# Patient Record
Sex: MALE | Race: BLACK OR AFRICAN AMERICAN | NOT HISPANIC OR LATINO | URBAN - METROPOLITAN AREA
[De-identification: names, ages, dates, MRNs, and addresses within clinical notes are randomized per-mention and may not be internally consistent; named-entity substitution may affect disease eponyms.]

---

## 2017-04-25 ENCOUNTER — INPATIENT (INPATIENT)
Facility: HOSPITAL | Age: 32
LOS: 0 days | Discharge: HOME | DRG: 300 | End: 2017-04-25
Attending: INTERNAL MEDICINE | Admitting: INTERNAL MEDICINE
Payer: COMMERCIAL

## 2017-04-25 VITALS
RESPIRATION RATE: 16 BRPM | HEART RATE: 76 BPM | DIASTOLIC BLOOD PRESSURE: 61 MMHG | TEMPERATURE: 97 F | OXYGEN SATURATION: 97 % | SYSTOLIC BLOOD PRESSURE: 99 MMHG

## 2017-04-25 VITALS
WEIGHT: 190.04 LBS | TEMPERATURE: 98 F | HEIGHT: 71 IN | HEART RATE: 97 BPM | OXYGEN SATURATION: 97 % | DIASTOLIC BLOOD PRESSURE: 76 MMHG | RESPIRATION RATE: 18 BRPM | SYSTOLIC BLOOD PRESSURE: 128 MMHG

## 2017-04-25 DIAGNOSIS — R63.8 OTHER SYMPTOMS AND SIGNS CONCERNING FOOD AND FLUID INTAKE: ICD-10-CM

## 2017-04-25 DIAGNOSIS — E10.628 TYPE 1 DIABETES MELLITUS WITH OTHER SKIN COMPLICATIONS: ICD-10-CM

## 2017-04-25 DIAGNOSIS — L03.90 CELLULITIS, UNSPECIFIED: ICD-10-CM

## 2017-04-25 DIAGNOSIS — Z29.9 ENCOUNTER FOR PROPHYLACTIC MEASURES, UNSPECIFIED: ICD-10-CM

## 2017-04-25 DIAGNOSIS — I82.601 ACUTE EMBOLISM AND THROMBOSIS OF UNSPECIFIED VEINS OF RIGHT UPPER EXTREMITY: ICD-10-CM

## 2017-04-25 LAB
ALBUMIN SERPL ELPH-MCNC: 3 G/DL — LOW (ref 3.4–5)
ALBUMIN SERPL ELPH-MCNC: 3.2 G/DL — LOW (ref 3.4–5)
ALP SERPL-CCNC: 120 U/L — SIGNIFICANT CHANGE UP (ref 40–120)
ALP SERPL-CCNC: 134 U/L — HIGH (ref 40–120)
ALT FLD-CCNC: 28 U/L — SIGNIFICANT CHANGE UP (ref 12–42)
ALT FLD-CCNC: 30 U/L — SIGNIFICANT CHANGE UP (ref 12–42)
ANION GAP SERPL CALC-SCNC: 8 MMOL/L — LOW (ref 9–16)
ANION GAP SERPL CALC-SCNC: 8 MMOL/L — LOW (ref 9–16)
APTT BLD: 28.8 SEC — SIGNIFICANT CHANGE UP (ref 27.5–36.5)
AST SERPL-CCNC: 13 U/L — LOW (ref 15–37)
AST SERPL-CCNC: 19 U/L — SIGNIFICANT CHANGE UP (ref 15–37)
BASOPHILS NFR BLD AUTO: 0.4 % — SIGNIFICANT CHANGE UP (ref 0–2)
BILIRUB SERPL-MCNC: 0.2 MG/DL — SIGNIFICANT CHANGE UP (ref 0.2–1.2)
BILIRUB SERPL-MCNC: 0.3 MG/DL — SIGNIFICANT CHANGE UP (ref 0.2–1.2)
BUN SERPL-MCNC: 13 MG/DL — SIGNIFICANT CHANGE UP (ref 7–23)
BUN SERPL-MCNC: 17 MG/DL — SIGNIFICANT CHANGE UP (ref 7–23)
CALCIUM SERPL-MCNC: 8.1 MG/DL — LOW (ref 8.5–10.5)
CALCIUM SERPL-MCNC: 8.7 MG/DL — SIGNIFICANT CHANGE UP (ref 8.5–10.5)
CHLORIDE SERPL-SCNC: 100 MMOL/L — SIGNIFICANT CHANGE UP (ref 96–108)
CHLORIDE SERPL-SCNC: 108 MMOL/L — SIGNIFICANT CHANGE UP (ref 96–108)
CO2 SERPL-SCNC: 24 MMOL/L — SIGNIFICANT CHANGE UP (ref 22–31)
CO2 SERPL-SCNC: 26 MMOL/L — SIGNIFICANT CHANGE UP (ref 22–31)
CREAT SERPL-MCNC: 0.77 MG/DL — SIGNIFICANT CHANGE UP (ref 0.5–1.3)
CREAT SERPL-MCNC: 1.17 MG/DL — SIGNIFICANT CHANGE UP (ref 0.5–1.3)
EOSINOPHIL NFR BLD AUTO: 3 % — SIGNIFICANT CHANGE UP (ref 0–6)
FERRITIN SERPL-MCNC: 125.1 NG/ML — SIGNIFICANT CHANGE UP (ref 26–388)
GLUCOSE SERPL-MCNC: 220 MG/DL — HIGH (ref 70–99)
GLUCOSE SERPL-MCNC: 687 MG/DL — CRITICAL HIGH (ref 70–99)
HCT VFR BLD CALC: 38 % — LOW (ref 39–50)
HCT VFR BLD CALC: 38.1 % — LOW (ref 39–50)
HGB BLD-MCNC: 12.1 G/DL — LOW (ref 13–17)
HGB BLD-MCNC: 12.5 G/DL — LOW (ref 13–17)
HIV 1+2 AB+HIV1 P24 AG SERPL QL IA: SIGNIFICANT CHANGE UP
IMM GRANULOCYTES NFR BLD AUTO: 0.8 % — SIGNIFICANT CHANGE UP (ref 0–1.5)
INR BLD: 0.97 — SIGNIFICANT CHANGE UP (ref 0.88–1.16)
IRON SATN MFR SERPL: 13 % — LOW (ref 26–39)
IRON SATN MFR SERPL: 36 UG/DL — LOW (ref 65–175)
LACTATE SERPL-SCNC: 1 MMOL/L — SIGNIFICANT CHANGE UP (ref 0.4–2)
LYMPHOCYTES # BLD AUTO: 25.1 % — SIGNIFICANT CHANGE UP (ref 13–44)
MAGNESIUM SERPL-MCNC: 2.2 MG/DL — SIGNIFICANT CHANGE UP (ref 1.6–2.4)
MCHC RBC-ENTMCNC: 30.9 PG — SIGNIFICANT CHANGE UP (ref 27–34)
MCHC RBC-ENTMCNC: 31 PG — SIGNIFICANT CHANGE UP (ref 27–34)
MCHC RBC-ENTMCNC: 31.8 G/DL — LOW (ref 32–36)
MCHC RBC-ENTMCNC: 32.9 G/DL — SIGNIFICANT CHANGE UP (ref 32–36)
MCV RBC AUTO: 94.3 FL — SIGNIFICANT CHANGE UP (ref 80–100)
MCV RBC AUTO: 97.2 FL — SIGNIFICANT CHANGE UP (ref 80–100)
MONOCYTES NFR BLD AUTO: 10.7 % — SIGNIFICANT CHANGE UP (ref 2–14)
NEUTROPHILS NFR BLD AUTO: 60 % — SIGNIFICANT CHANGE UP (ref 43–77)
PHOSPHATE SERPL-MCNC: 3.8 MG/DL — SIGNIFICANT CHANGE UP (ref 2.5–4.5)
PLATELET # BLD AUTO: 255 K/UL — SIGNIFICANT CHANGE UP (ref 150–400)
PLATELET # BLD AUTO: 276 K/UL — SIGNIFICANT CHANGE UP (ref 150–400)
POTASSIUM SERPL-MCNC: 4.4 MMOL/L — SIGNIFICANT CHANGE UP (ref 3.5–5.3)
POTASSIUM SERPL-MCNC: 5.3 MMOL/L — SIGNIFICANT CHANGE UP (ref 3.5–5.3)
POTASSIUM SERPL-SCNC: 4.4 MMOL/L — SIGNIFICANT CHANGE UP (ref 3.5–5.3)
POTASSIUM SERPL-SCNC: 5.3 MMOL/L — SIGNIFICANT CHANGE UP (ref 3.5–5.3)
PROT SERPL-MCNC: 6.6 G/DL — SIGNIFICANT CHANGE UP (ref 6.4–8.2)
PROT SERPL-MCNC: 6.8 G/DL — SIGNIFICANT CHANGE UP (ref 6.4–8.2)
PROTHROM AB SERPL-ACNC: 10.7 SEC — SIGNIFICANT CHANGE UP (ref 9.8–12.7)
RBC # BLD: 3.92 M/UL — LOW (ref 4.2–5.8)
RBC # BLD: 4.03 M/UL — LOW (ref 4.2–5.8)
RBC # FLD: 12.5 % — SIGNIFICANT CHANGE UP (ref 10.3–16.9)
RBC # FLD: 12.8 % — SIGNIFICANT CHANGE UP (ref 10.3–16.9)
SODIUM SERPL-SCNC: 134 MMOL/L — SIGNIFICANT CHANGE UP (ref 132–145)
SODIUM SERPL-SCNC: 140 MMOL/L — SIGNIFICANT CHANGE UP (ref 135–145)
TIBC SERPL-MCNC: 280 UG/DL — SIGNIFICANT CHANGE UP (ref 250–450)
WBC # BLD: 6.3 K/UL — SIGNIFICANT CHANGE UP (ref 3.8–10.5)
WBC # BLD: 7.3 K/UL — SIGNIFICANT CHANGE UP (ref 3.8–10.5)
WBC # FLD AUTO: 6.3 K/UL — SIGNIFICANT CHANGE UP (ref 3.8–10.5)
WBC # FLD AUTO: 7.3 K/UL — SIGNIFICANT CHANGE UP (ref 3.8–10.5)

## 2017-04-25 PROCEDURE — 73201 CT UPPER EXTREMITY W/DYE: CPT | Mod: 26,RT

## 2017-04-25 PROCEDURE — 73090 X-RAY EXAM OF FOREARM: CPT | Mod: 26,RT

## 2017-04-25 PROCEDURE — 99223 1ST HOSP IP/OBS HIGH 75: CPT

## 2017-04-25 PROCEDURE — 93971 EXTREMITY STUDY: CPT | Mod: 26,RT

## 2017-04-25 PROCEDURE — 93010 ELECTROCARDIOGRAM REPORT: CPT

## 2017-04-25 PROCEDURE — 99285 EMERGENCY DEPT VISIT HI MDM: CPT | Mod: 25

## 2017-04-25 RX ORDER — INSULIN HUMAN 100 [IU]/ML
10 INJECTION, SOLUTION SUBCUTANEOUS ONCE
Qty: 0 | Refills: 0 | Status: COMPLETED | OUTPATIENT
Start: 2017-04-25 | End: 2017-04-25

## 2017-04-25 RX ORDER — SODIUM CHLORIDE 9 MG/ML
2000 INJECTION INTRAMUSCULAR; INTRAVENOUS; SUBCUTANEOUS ONCE
Qty: 0 | Refills: 0 | Status: COMPLETED | OUTPATIENT
Start: 2017-04-25 | End: 2017-04-25

## 2017-04-25 RX ORDER — DEXTROSE 50 % IN WATER 50 %
12.5 SYRINGE (ML) INTRAVENOUS ONCE
Qty: 0 | Refills: 0 | Status: DISCONTINUED | OUTPATIENT
Start: 2017-04-25 | End: 2017-04-25

## 2017-04-25 RX ORDER — INSULIN GLARGINE 100 [IU]/ML
8 INJECTION, SOLUTION SUBCUTANEOUS EVERY MORNING
Qty: 0 | Refills: 0 | Status: DISCONTINUED | OUTPATIENT
Start: 2017-04-25 | End: 2017-04-25

## 2017-04-25 RX ORDER — RIVAROXABAN 15 MG-20MG
1 KIT ORAL
Qty: 1 | Refills: 0 | OUTPATIENT
Start: 2017-04-25

## 2017-04-25 RX ORDER — DEXTROSE 50 % IN WATER 50 %
25 SYRINGE (ML) INTRAVENOUS ONCE
Qty: 0 | Refills: 0 | Status: DISCONTINUED | OUTPATIENT
Start: 2017-04-25 | End: 2017-04-25

## 2017-04-25 RX ORDER — INSULIN GLARGINE 100 [IU]/ML
4 INJECTION, SOLUTION SUBCUTANEOUS
Qty: 0 | Refills: 0 | Status: DISCONTINUED | OUTPATIENT
Start: 2017-04-25 | End: 2017-04-25

## 2017-04-25 RX ORDER — GLUCAGON INJECTION, SOLUTION 0.5 MG/.1ML
1 INJECTION, SOLUTION SUBCUTANEOUS ONCE
Qty: 0 | Refills: 0 | Status: DISCONTINUED | OUTPATIENT
Start: 2017-04-25 | End: 2017-04-25

## 2017-04-25 RX ORDER — HEPARIN SODIUM 5000 [USP'U]/ML
5000 INJECTION INTRAVENOUS; SUBCUTANEOUS EVERY 8 HOURS
Qty: 0 | Refills: 0 | Status: DISCONTINUED | OUTPATIENT
Start: 2017-04-25 | End: 2017-04-25

## 2017-04-25 RX ORDER — MORPHINE SULFATE 50 MG/1
4 CAPSULE, EXTENDED RELEASE ORAL ONCE
Qty: 0 | Refills: 0 | Status: DISCONTINUED | OUTPATIENT
Start: 2017-04-25 | End: 2017-04-25

## 2017-04-25 RX ORDER — INSULIN LISPRO 100/ML
VIAL (ML) SUBCUTANEOUS
Qty: 0 | Refills: 0 | Status: DISCONTINUED | OUTPATIENT
Start: 2017-04-25 | End: 2017-04-25

## 2017-04-25 RX ORDER — INSULIN LISPRO 100/ML
20 VIAL (ML) SUBCUTANEOUS
Qty: 0 | Refills: 0 | Status: DISCONTINUED | OUTPATIENT
Start: 2017-04-25 | End: 2017-04-25

## 2017-04-25 RX ORDER — INSULIN GLARGINE 100 [IU]/ML
10 INJECTION, SOLUTION SUBCUTANEOUS EVERY MORNING
Qty: 0 | Refills: 0 | Status: DISCONTINUED | OUTPATIENT
Start: 2017-04-25 | End: 2017-04-25

## 2017-04-25 RX ORDER — SODIUM CHLORIDE 9 MG/ML
1000 INJECTION, SOLUTION INTRAVENOUS
Qty: 0 | Refills: 0 | Status: DISCONTINUED | OUTPATIENT
Start: 2017-04-25 | End: 2017-04-25

## 2017-04-25 RX ORDER — DEXTROSE 50 % IN WATER 50 %
1 SYRINGE (ML) INTRAVENOUS ONCE
Qty: 0 | Refills: 0 | Status: DISCONTINUED | OUTPATIENT
Start: 2017-04-25 | End: 2017-04-25

## 2017-04-25 RX ORDER — VANCOMYCIN HCL 1 G
1000 VIAL (EA) INTRAVENOUS ONCE
Qty: 0 | Refills: 0 | Status: COMPLETED | OUTPATIENT
Start: 2017-04-25 | End: 2017-04-25

## 2017-04-25 RX ADMIN — INSULIN HUMAN 10 UNIT(S): 100 INJECTION, SOLUTION SUBCUTANEOUS at 02:29

## 2017-04-25 RX ADMIN — INSULIN GLARGINE 10 UNIT(S): 100 INJECTION, SOLUTION SUBCUTANEOUS at 11:52

## 2017-04-25 RX ADMIN — Medication: at 11:55

## 2017-04-25 RX ADMIN — INSULIN HUMAN 10 UNIT(S): 100 INJECTION, SOLUTION SUBCUTANEOUS at 04:15

## 2017-04-25 RX ADMIN — SODIUM CHLORIDE 2000 MILLILITER(S): 9 INJECTION INTRAMUSCULAR; INTRAVENOUS; SUBCUTANEOUS at 03:10

## 2017-04-25 RX ADMIN — MORPHINE SULFATE 4 MILLIGRAM(S): 50 CAPSULE, EXTENDED RELEASE ORAL at 02:22

## 2017-04-25 RX ADMIN — Medication 2: at 08:18

## 2017-04-25 RX ADMIN — Medication 1 TABLET(S): at 10:44

## 2017-04-25 RX ADMIN — Medication 2 TABLET(S): at 08:19

## 2017-04-25 RX ADMIN — MORPHINE SULFATE 4 MILLIGRAM(S): 50 CAPSULE, EXTENDED RELEASE ORAL at 04:21

## 2017-04-25 RX ADMIN — Medication 250 MILLIGRAM(S): at 01:49

## 2017-04-25 NOTE — H&P ADULT - PROBLEM SELECTOR PROBLEM 3
Nutrition, metabolism, and development symptoms Uncontrolled type 1 diabetes mellitus with other skin complication

## 2017-04-25 NOTE — H&P ADULT - ASSESSMENT
30 yo M w/ DMT1 (non-compliant w/ insulin), p/w RUE swelling x 4 days concerning for cellulitis and hyperglycemia. 32 yo M w/ DMT1 (non-compliant w/ insulin), p/w RUE swelling x 4 days found to have thrombosis of R radial and R ulnar veins, hospital course further complicated by hyperglycemia.

## 2017-04-25 NOTE — ED PROVIDER NOTE - SKIN WOUND DESCRIPTION
laceration to R forearm, with sutures in place, no crepitus, proximal streaking or induration noted, ttp./clean/SWOLLEN

## 2017-04-25 NOTE — H&P ADULT - NSHPREVIEWOFSYSTEMS_GEN_ALL_CORE
Specifically denies HA, recent changes in vision, cough, CP, SOB, abdominal pain, diarrhea, constipation and dysuria.    Endorsing polyphagia, polydipsia and polyuria.

## 2017-04-25 NOTE — ED PROVIDER NOTE - CARE PLAN
Principal Discharge DX:	Uncontrolled type 1 diabetes mellitus with other skin complication  Secondary Diagnosis:	Wound infection

## 2017-04-25 NOTE — ED PROVIDER NOTE - UPPER EXTREMITY EXAM, RIGHT
LIMITED ROM/TENDERNESS/SWELLING/4 cm horizontal laceration to mid volar forearm, with sutures in place, taut, with diffuse edema, over forearm, hand, and distal fingertips, median/radial/ulnar nn intact to motor and sensory. +2 radial pulse

## 2017-04-25 NOTE — H&P ADULT - PROBLEM SELECTOR PLAN 3
Carbohydrate consistent diet  No indication for IVF as pt able to tolerate PO Pt has poor medical follow up and admits non-compliance w/ insulin therapy.  Reports that he was prescribed Novolog 30 units TID before meals and 4-8 units of Lantus BID. Initial BMP w/ glucose > 600, FS now 174 after 10 units of regular insulin.   - Will start on lantus 10 units q AM + FS + MISS (will try to simplify insulin regimen in setting of non-adherence)  - F/u A1C  - Diabetes education  - Will refer patient to diabetes clinic near home in [place] Pt has poor medical follow up and admits non-compliance w/ insulin therapy.  Reports that he was prescribed Novolog 30 units TID before meals and 4-8 units of Lantus BID. Initial BMP w/ glucose > 600, FS now 174 after 10 units of regular insulin.   - Will start on lantus 10 units q AM + FS + MISS (will try to simplify insulin regimen in setting of non-adherence)  - F/u A1C  - Diabetes education  - Will refer patient to PMD near home  - Will provide information for [facility]

## 2017-04-25 NOTE — DISCHARGE NOTE ADULT - CARE PLAN
Principal Discharge DX:	Cellulitis  Goal:	Resolution of infection  Instructions for follow-up, activity and diet:	You have an infection in your right arm.  It is very important to control your blood sugar while you have an infection as sugar can serve as food for bacteria and promote rapid progression of the infection.  You were given antibiotics in the hospital.  You are also being prescribed oral antibiotics; please take two tabs twice daily for a total of 7 days. If you experience fevers, chills or significant pain in the arm, please visit your nearest emergency department.  Secondary Diagnosis:	Thrombosis of right upper extremity  Instructions for follow-up, activity and diet:	You were found to have a clot in your right upper extremity likely secondary to trauma.  Please use warm compresses to dissolve the clot,  You should have a repeat ultrasound in 2-4 weeks to make sure the clot is dissolving.  Please follow up with a primary physician, information for one has been provided in this handout. If you start to experience difficulty breathing or shortness of breath, please visit your nearest emergency department.  Secondary Diagnosis:	Uncontrolled type 1 diabetes mellitus with other skin complication  Instructions for follow-up, activity and diet:	You were found to have an elevated blood sugar during your stay.  Please continue to take your insulin as prescribed and follow up with a primary physician.  Please also schedule an eye doctor appointment.

## 2017-04-25 NOTE — ED PROVIDER NOTE - MEDICAL DECISION MAKING DETAILS
IVFs, insulin, labs, crp, and sed rate. forearm xrays, hand consultation. spoke to ortho resident at [facility], advised plastics/hand

## 2017-04-25 NOTE — DISCHARGE NOTE ADULT - MEDICATION SUMMARY - MEDICATIONS TO TAKE
I will START or STAY ON the medications listed below when I get home from the hospital:    NovoLOG 100 units/mL subcutaneous solution  -- 30 unit(s) subcutaneous 3 times a day before meals  -- Indication: For Diabetes type 1, uncontrolled    Lantus  -- 4 unit(s) subcutaneous 2 times a day  -- Indication: For Diabetes type 1, uncontrolled    sulfamethoxazole-trimethoprim 800 mg-160 mg oral tablet  -- 2 tab(s) by mouth every 12 hours  -- Indication: For Cellulitis

## 2017-04-25 NOTE — DISCHARGE NOTE ADULT - CARE PROVIDER_API CALL
[facility],   Address: [address] [place] [address]  [phone]  Phone: (   )    -  Fax: (   )    -    [facility] [place] [facility] [place] ([facility] [place]),   Address: [address] [place] [address]  Phone: [phone]  Phone: (   )    -  Fax: (   )    -

## 2017-04-25 NOTE — DISCHARGE NOTE ADULT - HOSPITAL COURSE
32 yo M w/ DMT1 (non-compliant w/ insulin), p/w RUE swelling x 4 days concerning for cellulitis and hyperglycemia.  Also found to have RUE DVT - asked to use warm compress and follow up with vascular as outpt. Sent home on 7 days of Bactrim for cellulitis.  Hyperglycemia resolved asked to f/u w/ PMD. 32 yo M w/ DMT1 (non-compliant w/ insulin), p/w RUE swelling x 4 days concerning for cellulitis and hyperglycemia.  Also found to have RUE DVT - asked to use warm compress and follow up with vascular as outpt.  Called patient to discuss starting Xarelto, but pt gave invalid phone number.  Rx sent to pharmacy. Also sent home on 7 days of Bactrim for cellulitis.  Hyperglycemia resolved w/ 10 units of regular insulin; pt asked to continue home insulin regimen and f/u w/ PMD.  Also given references for [facility] endocrinology and vascular as pt rejected offer to schedule appointments.

## 2017-04-25 NOTE — H&P ADULT - ATTENDING COMMENTS
Patient was seen and examined by me at bedside. I agree with resident's note, subjective, objective physical exam, assessment and plan with following modifications/additions.     1) Uncontrolled diabetes mellitus type 2 with hyperglycemia.  2) DVT upper extremity -ulnar and radial vein.  3) Cellulitis right upper extremity.    Plan: Will c/w home dose insulin regimen. Patient refusing to have his regimen changed and states he is complaint with the medication. Will DC on Keflex for a total course of 7 days. Will start him on NOACs for 3 months. Patient will require repeat venous duplex US in 2-4 weeks for resolution of DVT. Needs outpatient follow up with PCP.

## 2017-04-25 NOTE — H&P ADULT - NSHPPHYSICALEXAM_GEN_ALL_CORE
General: NAD, comfortable, non-diaphoretic, non-toxic appearing  HEENT: 2cm healing laceration over L forehead covered w/ band aid, PERRL, EOMI, sclerae anicteric, no conjunctival injection, MMM  Resp: CTAB no w/r/r, no respiratory distress, able to speak in full sentences w/ ease  Cardio: RRR no m/g/r  Abd: NTND, + BS x 4, no hepatosplenomegaly  Ext: Significant swelling of RUE w/ 4 cm well-healing laceration of medial forearm w/ sutures, 1+ radial pulse, capillary refill < 2 sec, decreased  strength 2/2 swelling, no erythema or warmth appreciated, no abscesses, purulence, crepitus or induration seen. TTP w/ deep palpation 2/2 taut skin; LUE 1+ radial pulse, no pedal edema  Neuro: A&O x 3, CN ii-xii intact, no gross focal neurologic deficits

## 2017-04-25 NOTE — H&P ADULT - PROBLEM SELECTOR PLAN 1
No signs of systemic infection. No ulcers, abscesses or induration appreciated on exam. 1+ R radial pulse. CT w/ IV contrast of RUE reveals no fracture or abscesses. +Infiltration of the soft tissues of the right forearm, concerning for infection. S/p 1 dose of vancomycin.  Able to tolerate PO.    - Will start on Augmentin 875/125 q 12 h + Bactrim DS 2 tabs q 12 h x 10 days  - Keep R hand elevated  - Podiatry and opthalmology f/u as outpt No signs of systemic infection. No ulcers, abscesses or induration appreciated on exam. 1+ R radial pulse. CT w/ IV contrast of RUE reveals no fracture or abscesses. +Infiltration of the soft tissues of the right forearm, concerning for infection. S/p 1 dose of vancomycin.  Able to tolerate PO.    - Will start on Augmentin 875/125 q 12 h + Bactrim DS 2 tabs q 12 h x 10 days  - Keep R hand elevated  - F/u RUE dopplers to r/o DVT  - F/u official RUE xray to r/o osteo  - Podiatry and opthalmology f/u as outpt Low concern for cellulitis as no signs of systemic infection. Afebrile, no leukocytosis.  No ulcers, abscesses or induration appreciated on exam. 1+ R radial pulse. CT w/ IV contrast of RUE reveals no fracture or abscesses. +Infiltration of the soft tissues of the right forearm. XRay insignificant for osteomyelitis. S/p 1 dose of vancomycin.  Able to tolerate PO.  RUE dopplers significant for thrombosis of R radial and R ulnar veins. Likely provoked 2/2 endothelial injury from mechanical trauma. RUE dopplers significant for acute deep venous thrombosis of the distal right radial and ulnar veins.  - Initiate anticoagulation Likely provoked 2/2 endothelial injury from mechanical trauma. RUE dopplers significant for acute deep venous thrombosis of the distal right radial and ulnar veins.  - Warm compress, repeat venous duplex in 2-4 weeks  - Outpt vascular f/u

## 2017-04-25 NOTE — H&P ADULT - PROBLEM SELECTOR PLAN 5
HSQ  Dispo: Keep on RMF for further management of cellulitis  FULL CODE HSQ  Dispo: Keep on RMF for further management of RUE DVT  FULL CODE

## 2017-04-25 NOTE — H&P ADULT - PROBLEM SELECTOR PLAN 2
Pt has poor medical follow up and proclaims non-compliance w/ insulin therapy.  Reports that he was prescribed Novolog 30 units TID before meals and 4-8 units of Lantus BID. Initial BMP w/ glucose > 600, FS now 174 after 10 units of regular insulin.   - Humalog 20 units premeal TID + 4 units lantus qhs + FS + MISS  - F/u A1C  - Diabetes education  - Will refer patient to diabetes clinic near home in [place] Pt has poor medical follow up and admits non-compliance w/ insulin therapy.  Reports that he was prescribed Novolog 30 units TID before meals and 4-8 units of Lantus BID. Initial BMP w/ glucose > 600, FS now 174 after 10 units of regular insulin.   - Humalog 20 units premeal TID + 4 units lantus qhs + FS + MISS  - F/u A1C  - Diabetes education  - Will refer patient to diabetes clinic near home in [place] Pt has poor medical follow up and admits non-compliance w/ insulin therapy.  Reports that he was prescribed Novolog 30 units TID before meals and 4-8 units of Lantus BID. Initial BMP w/ glucose > 600, FS now 174 after 10 units of regular insulin.   - Will start on lantus 10 units q AM + FS + MISS (will try to simplify insulin regimen in setting of non-adherence)  - F/u A1C  - Diabetes education  - Will refer patient to diabetes clinic near home in [place] No signs of systemic infection. No ulcers, abscesses or induration appreciated on exam. 1+ R radial pulse. CT w/ IV contrast of RUE reveals no fracture or abscesses. +Infiltration of the soft tissues of the right forearm, concerning for infection. S/p 1 dose of vancomycin.  Able to tolerate PO.    - Will start on Augmentin 875/125 q 12 h + Bactrim DS 2 tabs q 12 h x 10 days  - Keep R hand elevated  - F/u RUE dopplers to r/o DVT  - F/u official RUE xray to r/o osteo  - Podiatry and opthalmology f/u as outpt Low suspicion for cellulitis as no signs of systemic infection (fever, leukocytosis). No ulcers, abscesses or induration appreciated on exam. No significant erythema or warmth appreciated. 1+ R radial pulse. CT w/ IV contrast of RUE reveals no fracture or abscesses. +Infiltration of the soft tissues of the right forearm. S/p 1 dose of vancomycin.    - No indication for abx at this time.   - Aggressive blood glucose control. Low suspicion for cellulitis as no signs of systemic infection (fever, leukocytosis). No ulcers, abscesses or induration appreciated on exam. No significant erythema or warmth appreciated. 1+ R radial pulse. CT w/ IV contrast of RUE reveals no fracture or abscesses. +Infiltration of the soft tissues of the right forearm. S/p 1 dose of vancomycin, Augmentin and bactrim.   - No indication for abx at this time.   - Aggressive blood glucose control. Low suspicion for cellulitis as no signs of systemic infection (fever, leukocytosis). No ulcers, abscesses or induration appreciated on exam. No significant erythema or warmth appreciated. 1+ R radial pulse. CT w/ IV contrast of RUE reveals no fracture or abscesses. +Infiltration of the soft tissues of the right forearm. S/p 1 dose of vancomycin, Augmentin and bactrim.   - C/w Bactrim x 7 days
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
## 2017-04-25 NOTE — DISCHARGE NOTE ADULT - PATIENT PORTAL LINK FT
“You can access the FollowHealth Patient Portal, offered by [facility], by registering with the following website: [email or web address]”

## 2017-04-25 NOTE — ED ADULT NURSE NOTE - OBJECTIVE STATEMENT
right hand pain and swelling today; sutures to right forearm 4 days ago at [facility] but now has swelling, pain, s/s compartment syndrome

## 2017-04-25 NOTE — DISCHARGE NOTE ADULT - SECONDARY DIAGNOSIS.
Thrombosis of right upper extremity Uncontrolled type 1 diabetes mellitus with other skin complication

## 2017-04-25 NOTE — ED ADULT TRIAGE NOTE - CHIEF COMPLAINT QUOTE
Pt sent by Urgent care earlier today to R/ O compartment syndrome in the R hand. Pt had stitches placed Friday and has swelling and pain to the R forearm and hand
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
## 2017-04-25 NOTE — H&P ADULT - PROBLEM SELECTOR PLAN 4
HSQ  Dispo: Keep on RMF for further management of cellulitis  FULL CODE Carbohydrate consistent diet  No indication for IVF as pt able to tolerate PO

## 2017-04-25 NOTE — DISCHARGE NOTE ADULT - PLAN OF CARE
Resolution of infection You have an infection in your right arm.  It is very important to control your blood sugar while you have an infection as sugar can serve as food for bacteria and promote rapid progression of the infection.  You were given antibiotics in the hospital.  You are also being prescribed oral antibiotics; please take two tabs twice daily for a total of 7 days. If you experience fevers, chills or significant pain in the arm, please visit your nearest emergency department. You were found to have a clot in your right upper extremity likely secondary to trauma.  Please use warm compresses to dissolve the clot,  You should have a repeat ultrasound in 2-4 weeks to make sure the clot is dissolving.  Please follow up with a primary physician, information for one has been provided in this handout. If you start to experience difficulty breathing or shortness of breath, please visit your nearest emergency department. You were found to have an elevated blood sugar during your stay.  Please continue to take your insulin as prescribed and follow up with a primary physician.  Please also schedule an eye doctor appointment.

## 2017-04-26 LAB
FOLATE SERPL-MCNC: 7.7 NG/ML — SIGNIFICANT CHANGE UP (ref 4.8–24.2)
VIT B12 SERPL-MCNC: 444 PG/ML — SIGNIFICANT CHANGE UP (ref 243–894)

## 2017-04-26 RX ORDER — INSULIN GLARGINE 100 [IU]/ML
4 INJECTION, SOLUTION SUBCUTANEOUS
Qty: 0 | Refills: 0 | COMMUNITY

## 2017-04-26 RX ORDER — INSULIN ASPART 100 [IU]/ML
30 INJECTION, SOLUTION SUBCUTANEOUS
Qty: 0 | Refills: 0 | COMMUNITY

## 2017-04-27 DIAGNOSIS — S49.81XA OTHER SPECIFIED INJURIES OF RIGHT SHOULDER AND UPPER ARM, INITIAL ENCOUNTER: ICD-10-CM

## 2017-04-27 DIAGNOSIS — Z91.012 ALLERGY TO EGGS: ICD-10-CM

## 2017-04-27 DIAGNOSIS — Z91.013 ALLERGY TO SEAFOOD: ICD-10-CM

## 2017-04-27 DIAGNOSIS — E10.628 TYPE 1 DIABETES MELLITUS WITH OTHER SKIN COMPLICATIONS: ICD-10-CM

## 2017-04-27 DIAGNOSIS — L03.113 CELLULITIS OF RIGHT UPPER LIMB: ICD-10-CM

## 2017-04-27 DIAGNOSIS — E10.65 TYPE 1 DIABETES MELLITUS WITH HYPERGLYCEMIA: ICD-10-CM
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
## 2017-04-27 DIAGNOSIS — M79.601 PAIN IN RIGHT ARM: ICD-10-CM
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
## 2017-04-27 DIAGNOSIS — I82.621 ACUTE EMBOLISM AND THROMBOSIS OF DEEP VEINS OF RIGHT UPPER EXTREMITY: ICD-10-CM

## 2017-04-27 DIAGNOSIS — Z91.19 PATIENT'S NONCOMPLIANCE WITH OTHER MEDICAL TREATMENT AND REGIMEN: ICD-10-CM

## 2017-04-30 LAB
CULTURE RESULTS: SIGNIFICANT CHANGE UP
CULTURE RESULTS: SIGNIFICANT CHANGE UP
SPECIMEN SOURCE: SIGNIFICANT CHANGE UP
SPECIMEN SOURCE: SIGNIFICANT CHANGE UP

## 2017-07-23 PROCEDURE — 36415 COLL VENOUS BLD VENIPUNCTURE: CPT

## 2017-07-23 PROCEDURE — 80053 COMPREHEN METABOLIC PANEL: CPT

## 2017-07-23 PROCEDURE — 93005 ELECTROCARDIOGRAM TRACING: CPT

## 2017-07-23 PROCEDURE — 85730 THROMBOPLASTIN TIME PARTIAL: CPT

## 2017-07-23 PROCEDURE — 73201 CT UPPER EXTREMITY W/DYE: CPT

## 2017-07-23 PROCEDURE — 82746 ASSAY OF FOLIC ACID SERUM: CPT

## 2017-07-23 PROCEDURE — 85610 PROTHROMBIN TIME: CPT

## 2017-07-23 PROCEDURE — 83735 ASSAY OF MAGNESIUM: CPT

## 2017-07-23 PROCEDURE — 83605 ASSAY OF LACTIC ACID: CPT

## 2017-07-23 PROCEDURE — 93971 EXTREMITY STUDY: CPT

## 2017-07-23 PROCEDURE — 96376 TX/PRO/DX INJ SAME DRUG ADON: CPT

## 2017-07-23 PROCEDURE — 85025 COMPLETE CBC W/AUTO DIFF WBC: CPT

## 2017-07-23 PROCEDURE — 96375 TX/PRO/DX INJ NEW DRUG ADDON: CPT

## 2017-07-23 PROCEDURE — 87389 HIV-1 AG W/HIV-1&-2 AB AG IA: CPT

## 2017-07-23 PROCEDURE — 83550 IRON BINDING TEST: CPT

## 2017-07-23 PROCEDURE — 84100 ASSAY OF PHOSPHORUS: CPT

## 2017-07-23 PROCEDURE — 82728 ASSAY OF FERRITIN: CPT

## 2017-07-23 PROCEDURE — 82607 VITAMIN B-12: CPT

## 2017-07-23 PROCEDURE — 85027 COMPLETE CBC AUTOMATED: CPT

## 2017-07-23 PROCEDURE — 87040 BLOOD CULTURE FOR BACTERIA: CPT

## 2017-07-23 PROCEDURE — 96374 THER/PROPH/DIAG INJ IV PUSH: CPT

## 2017-07-23 PROCEDURE — 99285 EMERGENCY DEPT VISIT HI MDM: CPT | Mod: 25

## 2017-07-23 PROCEDURE — 73090 X-RAY EXAM OF FOREARM: CPT

## 2018-03-02 ENCOUNTER — EMERGENCY (EMERGENCY)
Facility: HOSPITAL | Age: 33
LOS: 1 days | Discharge: HOME | End: 2018-03-02
Attending: EMERGENCY MEDICINE | Admitting: EMERGENCY MEDICINE
Payer: MEDICAID

## 2018-03-02 VITALS
HEART RATE: 80 BPM | DIASTOLIC BLOOD PRESSURE: 77 MMHG | TEMPERATURE: 98 F | OXYGEN SATURATION: 100 % | RESPIRATION RATE: 18 BRPM | SYSTOLIC BLOOD PRESSURE: 138 MMHG

## 2018-03-02 DIAGNOSIS — Z91.012 ALLERGY TO EGGS: ICD-10-CM

## 2018-03-02 DIAGNOSIS — E10.9 TYPE 1 DIABETES MELLITUS WITHOUT COMPLICATIONS: ICD-10-CM

## 2018-03-02 DIAGNOSIS — M79.641 PAIN IN RIGHT HAND: ICD-10-CM

## 2018-03-02 DIAGNOSIS — Z79.4 LONG TERM (CURRENT) USE OF INSULIN: ICD-10-CM

## 2018-03-02 DIAGNOSIS — Z91.013 ALLERGY TO SEAFOOD: ICD-10-CM
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
## 2018-03-02 DIAGNOSIS — Z79.899 OTHER LONG TERM (CURRENT) DRUG THERAPY: ICD-10-CM

## 2018-03-02 DIAGNOSIS — S62.398A OTHER FRACTURE OF OTHER METACARPAL BONE, INITIAL ENCOUNTER FOR CLOSED FRACTURE: ICD-10-CM

## 2018-03-02 PROCEDURE — 73130 X-RAY EXAM OF HAND: CPT | Mod: 26,RT

## 2018-03-02 PROCEDURE — 99284 EMERGENCY DEPT VISIT MOD MDM: CPT | Mod: 25

## 2018-03-02 PROCEDURE — 26605 TREAT METACARPAL FRACTURE: CPT | Mod: 54
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
## 2018-03-02 NOTE — ED PROVIDER NOTE - PHYSICAL EXAMINATION
R hand: edema and ecchymosis proximal to 4th and 5th digit. + tenderness to palpation over 5th metacarpal. sensation intact. minimal ROM and strength to 5th digit. <2 second capillary refill.     VITAL SIGNS: I have reviewed nursing notes and confirm.  CONSTITUTIONAL: Well-developed; well-nourished; in no acute distress.  SKIN: Skin is warm and dry, no acute rash.  HEAD: Normocephalic; atraumatic.  NECK: Supple; non tender.  EXT: Normal ROM. No clubbing, cyanosis or edema.  NEURO: Alert, oriented. Grossly unremarkable.  PSYCH: Cooperative, appropriate.
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
## 2018-03-03 RX ORDER — IBUPROFEN 200 MG
600 TABLET ORAL ONCE
Qty: 0 | Refills: 0 | Status: COMPLETED | OUTPATIENT
Start: 2018-03-03 | End: 2018-03-03

## 2018-03-03 RX ADMIN — Medication 600 MILLIGRAM(S): at 00:07

## 2018-03-03 NOTE — ED PROCEDURE NOTE - ATTENDING CONTRIBUTION TO CARE
Hematoma block, fx reduction attempted but hand very swollen. Splinted in safe position with fingers extended

## 2018-03-03 NOTE — ED PROCEDURE NOTE - NS ED PERI VASCULAR NEG
capillary refill time < 2 seconds/fingers/toes warm to touch/no swelling/no cyanosis of extremity/no paresthesia

## 2019-05-24 ENCOUNTER — EMERGENCY (EMERGENCY)
Facility: HOSPITAL | Age: 34
LOS: 1 days | Discharge: HOME | End: 2019-05-24
Attending: EMERGENCY MEDICINE | Admitting: EMERGENCY MEDICINE
Payer: MEDICAID

## 2019-05-24 VITALS
DIASTOLIC BLOOD PRESSURE: 70 MMHG | OXYGEN SATURATION: 100 % | TEMPERATURE: 98 F | HEART RATE: 83 BPM | SYSTOLIC BLOOD PRESSURE: 106 MMHG | RESPIRATION RATE: 16 BRPM

## 2019-05-24 VITALS
OXYGEN SATURATION: 99 % | HEART RATE: 84 BPM | RESPIRATION RATE: 18 BRPM | DIASTOLIC BLOOD PRESSURE: 81 MMHG | SYSTOLIC BLOOD PRESSURE: 141 MMHG | TEMPERATURE: 98 F

## 2019-05-24 DIAGNOSIS — Z91.013 ALLERGY TO SEAFOOD: ICD-10-CM

## 2019-05-24 DIAGNOSIS — Z91.012 ALLERGY TO EGGS: ICD-10-CM

## 2019-05-24 DIAGNOSIS — Z79.891 LONG TERM (CURRENT) USE OF OPIATE ANALGESIC: ICD-10-CM

## 2019-05-24 DIAGNOSIS — S22.32XA FRACTURE OF ONE RIB, LEFT SIDE, INITIAL ENCOUNTER FOR CLOSED FRACTURE: ICD-10-CM
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
## 2019-05-24 DIAGNOSIS — Z79.4 LONG TERM (CURRENT) USE OF INSULIN: ICD-10-CM

## 2019-05-24 DIAGNOSIS — R07.81 PLEURODYNIA: ICD-10-CM
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
## 2019-05-24 DIAGNOSIS — Z79.899 OTHER LONG TERM (CURRENT) DRUG THERAPY: ICD-10-CM

## 2019-05-24 DIAGNOSIS — E10.9 TYPE 1 DIABETES MELLITUS WITHOUT COMPLICATIONS: ICD-10-CM

## 2019-05-24 PROBLEM — E10.65 TYPE 1 DIABETES MELLITUS WITH HYPERGLYCEMIA: Chronic | Status: ACTIVE | Noted: 2017-04-25

## 2019-05-24 LAB
ALBUMIN SERPL ELPH-MCNC: 3.3 G/DL — LOW (ref 3.4–5)
ALP SERPL-CCNC: 104 U/L — SIGNIFICANT CHANGE UP (ref 40–120)
ALT FLD-CCNC: 48 U/L — HIGH (ref 12–42)
ANION GAP SERPL CALC-SCNC: 9 MMOL/L — SIGNIFICANT CHANGE UP (ref 9–16)
AST SERPL-CCNC: 25 U/L — SIGNIFICANT CHANGE UP (ref 15–37)
BASOPHILS NFR BLD AUTO: 0.5 % — SIGNIFICANT CHANGE UP (ref 0–2)
BILIRUB SERPL-MCNC: 0.4 MG/DL — SIGNIFICANT CHANGE UP (ref 0.2–1.2)
BUN SERPL-MCNC: 13 MG/DL — SIGNIFICANT CHANGE UP (ref 7–23)
CALCIUM SERPL-MCNC: 8.8 MG/DL — SIGNIFICANT CHANGE UP (ref 8.5–10.5)
CHLORIDE SERPL-SCNC: 102 MMOL/L — SIGNIFICANT CHANGE UP (ref 96–108)
CO2 SERPL-SCNC: 28 MMOL/L — SIGNIFICANT CHANGE UP (ref 22–31)
CREAT SERPL-MCNC: 1.1 MG/DL — SIGNIFICANT CHANGE UP (ref 0.5–1.3)
EOSINOPHIL NFR BLD AUTO: 3.9 % — SIGNIFICANT CHANGE UP (ref 0–6)
GLUCOSE SERPL-MCNC: 434 MG/DL — HIGH (ref 70–99)
HCT VFR BLD CALC: 39.6 % — SIGNIFICANT CHANGE UP (ref 39–50)
HGB BLD-MCNC: 13.1 G/DL — SIGNIFICANT CHANGE UP (ref 13–17)
IMM GRANULOCYTES NFR BLD AUTO: 0.3 % — SIGNIFICANT CHANGE UP (ref 0–1.5)
LYMPHOCYTES # BLD AUTO: 19.6 % — SIGNIFICANT CHANGE UP (ref 13–44)
MCHC RBC-ENTMCNC: 30.7 PG — SIGNIFICANT CHANGE UP (ref 27–34)
MCHC RBC-ENTMCNC: 33.1 G/DL — SIGNIFICANT CHANGE UP (ref 32–36)
MCV RBC AUTO: 92.7 FL — SIGNIFICANT CHANGE UP (ref 80–100)
MONOCYTES NFR BLD AUTO: 11 % — SIGNIFICANT CHANGE UP (ref 2–14)
NEUTROPHILS NFR BLD AUTO: 64.7 % — SIGNIFICANT CHANGE UP (ref 43–77)
PLATELET # BLD AUTO: 281 K/UL — SIGNIFICANT CHANGE UP (ref 150–400)
POTASSIUM SERPL-MCNC: 4.8 MMOL/L — SIGNIFICANT CHANGE UP (ref 3.5–5.3)
POTASSIUM SERPL-SCNC: 4.8 MMOL/L — SIGNIFICANT CHANGE UP (ref 3.5–5.3)
PROT SERPL-MCNC: 6.9 G/DL — SIGNIFICANT CHANGE UP (ref 6.4–8.2)
RBC # BLD: 4.27 M/UL — SIGNIFICANT CHANGE UP (ref 4.2–5.8)
RBC # FLD: 12.2 % — SIGNIFICANT CHANGE UP (ref 10.3–14.5)
SODIUM SERPL-SCNC: 139 MMOL/L — SIGNIFICANT CHANGE UP (ref 132–145)
WBC # BLD: 6.2 K/UL — SIGNIFICANT CHANGE UP (ref 3.8–10.5)
WBC # FLD AUTO: 6.2 K/UL — SIGNIFICANT CHANGE UP (ref 3.8–10.5)

## 2019-05-24 PROCEDURE — 71101 X-RAY EXAM UNILAT RIBS/CHEST: CPT | Mod: 26,LT

## 2019-05-24 PROCEDURE — 99284 EMERGENCY DEPT VISIT MOD MDM: CPT | Mod: 25

## 2019-05-24 RX ORDER — SODIUM CHLORIDE 9 MG/ML
1000 INJECTION INTRAMUSCULAR; INTRAVENOUS; SUBCUTANEOUS ONCE
Refills: 0 | Status: COMPLETED | OUTPATIENT
Start: 2019-05-24 | End: 2019-05-24

## 2019-05-24 RX ORDER — INSULIN HUMAN 100 [IU]/ML
8 INJECTION, SOLUTION SUBCUTANEOUS ONCE
Refills: 0 | Status: COMPLETED | OUTPATIENT
Start: 2019-05-24 | End: 2019-05-24

## 2019-05-24 RX ORDER — IBUPROFEN 200 MG
1 TABLET ORAL
Qty: 40 | Refills: 0
Start: 2019-05-24 | End: 2019-06-02

## 2019-05-24 RX ORDER — INSULIN LISPRO 100/ML
25 VIAL (ML) SUBCUTANEOUS
Qty: 10 | Refills: 0
Start: 2019-05-24

## 2019-05-24 RX ADMIN — INSULIN HUMAN 8 UNIT(S): 100 INJECTION, SOLUTION SUBCUTANEOUS at 12:24

## 2019-05-24 RX ADMIN — SODIUM CHLORIDE 1000 MILLILITER(S): 9 INJECTION INTRAMUSCULAR; INTRAVENOUS; SUBCUTANEOUS at 11:22

## 2019-05-24 NOTE — ED ADULT NURSE NOTE - NSIMPLEMENTINTERV_GEN_ALL_ED
Implemented All Universal Safety Interventions:  [place] to call system. Call bell, personal items and telephone within reach. Instruct patient to call for assistance. Room bathroom lighting operational. Non-slip footwear when patient is off stretcher. Physically safe environment: no spills, clutter or unnecessary equipment. Stretcher in lowest position, wheels locked, appropriate side rails in place.

## 2019-05-24 NOTE — ED ADULT NURSE NOTE - CHPI ED NUR SYMPTOMS NEG
no decreased eating/drinking/no dizziness/no weakness/no vomiting/no chills/no fever/no nausea/no pain/no tingling

## 2019-05-24 NOTE — ED PROVIDER NOTE - CLINICAL SUMMARY MEDICAL DECISION MAKING FREE TEXT BOX
pt comfortable throughout ed stay.  blood glucose improved.   xray results dw the patient all questions answered   ED evaluation and management discussed with the patient and family (if available) in detail.  Close PMD follow up encouraged.  Strict ED return instructions discussed in detail and patient given the opportunity to ask any questions about their discharge diagnosis and instructions. Patient verbalized understanding. pt comfortable throughout ed stay.  blood glucose improved.   xray results dw the patient all questions answered pt is alert with fluent and appropriate speech with normal gait at time of dc home   ED evaluation and management discussed with the patient and family (if available) in detail.  Close PMD follow up encouraged.  Strict ED return instructions discussed in detail and patient given the opportunity to ask any questions about their discharge diagnosis and instructions. Patient verbalized understanding.

## 2019-05-24 NOTE — ED PROVIDER NOTE - NSFOLLOWUPINSTRUCTIONS_ED_ALL_ED_FT
take all medications as directed.     follow up with your doctor as scheduled.     return to ER for worsening pain or any other concern
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
## 2019-05-24 NOTE — ED PROVIDER NOTE - RESPIRATORY, MLM
Breath sounds clear and equal bilaterally. Breath sounds clear and equal bilaterally. left lower lateral chest wall tenderness

## 2019-05-24 NOTE — ED PROVIDER NOTE - PSH
DM (Diabetes Mellitus)    DM (Diabetes Mellitus)    DM (Diabetes Mellitus)    No significant past surgical history
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
## 2019-05-24 NOTE — ED ADULT NURSE NOTE - OBJECTIVE STATEMENT
Pt presents to ED with c/o rib pain and hyperglycemia, hx of uncontrolled type 1 DM - has not taken insulin > 12 hours,  mg/dL, states he does not travel with his glucometer - respirations even and unlabored, to tachypnea or tachycardia, patient speaking in full sentences, odd affect, poor eye contact and evasive of this writer's questioning, therefore able to obtain only a limited hx
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
## 2019-05-24 NOTE — ED ADULT TRIAGE NOTE - CHIEF COMPLAINT QUOTE
Pt c/o left sided rib pain x 1 week. Pt states that he was being detained by [facility] and sustained trauma to left side of chest. Painful on inspiration. Able to complete full sentences. Also requesting insulin refill. Last time he took his insulin was 12 days ago. Pt c/o left sided rib pain x 1 week. Pt states that he was being detained by [place] and sustained trauma to left side of chest. Painful on inspiration. Able to complete full sentences. Also requesting insulin refill. Last time he took his insulin was 12 days ago.

## 2019-05-24 NOTE — ED PROVIDER NOTE - OBJECTIVE STATEMENT
Nurse Triage Note: Pt c/o left sided rib pain x 1 week. Pt states that he was being detained by [facility] and sustained trauma to left side of chest. Painful on inspiration. Able to complete full sentences. Also requesting insulin refill. Last time he took his insulin was 12 days ago.   Individuals present during HPI: Dr. Winters, Suma Alicea (Scribe), Blade Ambrose (Pt)  Vital Signs: HR 84, /81, Resp 18, Temp(F) 98, SpO2 99    35 y/o male with PMHx of IDDM presents to the ED with complaints of left-sided chest wall soreness with associated difficulty breathing. Pt states 1 week ago, he had a hypoglycemic episode and woke up in police custody. Poor historian-state he does not remember what happened. He assumes he sustained trauma while being detained. He is also c/o right hand numbness to the top of the hand. He is also requesting an Insulin refill, stating that he ran out of his prescription 12 days ago. Denies fever, chills, abd pain, N/V. Nurse Triage Note: Pt c/o left sided rib pain x 1 week. Pt states that he was being detained by [facility] and sustained trauma to left side of chest. Painful on inspiration. Able to complete full sentences. Also requesting insulin refill. Last time he took his insulin was 12 days ago.   Individuals present during HPI: Dr. Winters, Suma Alicea (Scribe), Blade Ambrose (Pt)  Vital Signs: HR 84, /81, Resp 18, Temp(F) 98, SpO2 99    33 y/o male with PMHx of IDDM presents to the ED with complaints of non progressive but persistant left-sided chest wall soreness s/p arrest by police officers during which he thinks he may have sustained injury.. He is also requesting an Insulin refill, stating that he ran out of his prescription 12 days ago. Denies fever, chills, abd pain, N/V.

## 2020-12-01 NOTE — ED ADULT NURSE NOTE - NS ED NURSE LEVEL OF CONSCIOUSNESS ORIENTATION
Oriented - self; Oriented - place; Oriented - time
Detail Level: Simple
Additional Notes: Patient consent was obtained to proceed with the visit and recommended plan of care after discussion of all risks and benefits, including the risks of COVID-19 exposure.

## 2025-06-16 NOTE — ED ADULT NURSE NOTE - CARDIO WDL
Patient was admitted to the hospital after a fall, and for A-fib with RVR.  Discharged home 6/15/2025.  Please call the patient's daughter on 6/16/2025.  Check how the patient is doing.  Please schedule TCM this week.  
Normal rate, regular rhythm, normal S1, S2 heart sounds heard.